# Patient Record
Sex: MALE
[De-identification: names, ages, dates, MRNs, and addresses within clinical notes are randomized per-mention and may not be internally consistent; named-entity substitution may affect disease eponyms.]

---

## 2019-05-29 ENCOUNTER — RX RENEWAL (OUTPATIENT)
Age: 54
End: 2019-05-29

## 2019-06-05 ENCOUNTER — APPOINTMENT (OUTPATIENT)
Dept: INTERNAL MEDICINE | Facility: CLINIC | Age: 54
End: 2019-06-05

## 2019-06-17 ENCOUNTER — RX CHANGE (OUTPATIENT)
Age: 54
End: 2019-06-17

## 2019-06-28 ENCOUNTER — APPOINTMENT (OUTPATIENT)
Dept: INTERNAL MEDICINE | Facility: CLINIC | Age: 54
End: 2019-06-28
Payer: COMMERCIAL

## 2019-06-28 VITALS
SYSTOLIC BLOOD PRESSURE: 130 MMHG | HEIGHT: 69 IN | DIASTOLIC BLOOD PRESSURE: 90 MMHG | HEART RATE: 78 BPM | WEIGHT: 219 LBS | OXYGEN SATURATION: 96 % | BODY MASS INDEX: 32.44 KG/M2 | TEMPERATURE: 98.6 F

## 2019-06-28 DIAGNOSIS — Z80.42 FAMILY HISTORY OF MALIGNANT NEOPLASM OF PROSTATE: ICD-10-CM

## 2019-06-28 DIAGNOSIS — Z80.8 FAMILY HISTORY OF MALIGNANT NEOPLASM OF OTHER ORGANS OR SYSTEMS: ICD-10-CM

## 2019-06-28 DIAGNOSIS — Z78.9 OTHER SPECIFIED HEALTH STATUS: ICD-10-CM

## 2019-06-28 DIAGNOSIS — M19.90 UNSPECIFIED OSTEOARTHRITIS, UNSPECIFIED SITE: ICD-10-CM

## 2019-06-28 PROCEDURE — 99204 OFFICE O/P NEW MOD 45 MIN: CPT | Mod: 25

## 2019-06-28 PROCEDURE — G0444 DEPRESSION SCREEN ANNUAL: CPT

## 2019-06-28 NOTE — HEALTH RISK ASSESSMENT
[Yes] : Yes [2 - 4 times a month (2 pts)] : 2-4 times a month (2 points) [1 or 2 (0 pts)] : 1 or 2 (0 points) [No falls in past year] : Patient reported no falls in the past year [1] : 1) Little interest or pleasure doing things for several days (1) [0] : 2) Feeling down, depressed, or hopeless: Not at all (0) [de-identified] : cycling  [] : No

## 2019-06-28 NOTE — PLAN
[FreeTextEntry1] : counselled patient on stress management, will increase duloxetine to 90mg a day, and will see therapist

## 2019-06-28 NOTE — COUNSELING
[Weight management counseling provided] : Weight management [ - Annual Depression Screening] : Annual Depression Screening [Behavioral health counseling provided] : behavioral health

## 2019-06-28 NOTE — HISTORY OF PRESENT ILLNESS
[FreeTextEntry8] : Pt presents for renewal of meds. doing well, except c/o of minor aches and pains, and foot pain. skin rash. \par s/p thr 2/2019\par uses trazodone to sleep, sometimes is not effective. increased stress at work.\par \par

## 2019-06-28 NOTE — PHYSICAL EXAM
[Well Nourished] : well nourished [No Acute Distress] : no acute distress [Normal Sclera/Conjunctiva] : normal sclera/conjunctiva [Well-Appearing] : well-appearing [Well Developed] : well developed [PERRL] : pupils equal round and reactive to light [EOMI] : extraocular movements intact [Normal Outer Ear/Nose] : the outer ears and nose were normal in appearance [Normal Oropharynx] : the oropharynx was normal [No Lymphadenopathy] : no lymphadenopathy [Supple] : supple [No JVD] : no jugular venous distention [Thyroid Normal, No Nodules] : the thyroid was normal and there were no nodules present [Clear to Auscultation] : lungs were clear to auscultation bilaterally [No Respiratory Distress] : no respiratory distress  [Regular Rhythm] : with a regular rhythm [No Accessory Muscle Use] : no accessory muscle use [Normal Rate] : normal rate  [No Murmur] : no murmur heard [Normal S1, S2] : normal S1 and S2 [No Varicosities] : no varicosities [No Abdominal Bruit] : a ~M bruit was not heard ~T in the abdomen [No Carotid Bruits] : no carotid bruits [Pedal Pulses Present] : the pedal pulses are present [No Edema] : there was no peripheral edema [No Extremity Clubbing/Cyanosis] : no extremity clubbing/cyanosis [Soft] : abdomen soft [No Palpable Aorta] : no palpable aorta [No Masses] : no abdominal mass palpated [Non-distended] : non-distended [Non Tender] : non-tender [No HSM] : no HSM [Normal Bowel Sounds] : normal bowel sounds [Normal Posterior Cervical Nodes] : no posterior cervical lymphadenopathy [Normal Anterior Cervical Nodes] : no anterior cervical lymphadenopathy [No CVA Tenderness] : no CVA  tenderness [No Spinal Tenderness] : no spinal tenderness [No Joint Swelling] : no joint swelling [Grossly Normal Strength/Tone] : grossly normal strength/tone [Coordination Grossly Intact] : coordination grossly intact [No Rash] : no rash [Normal Gait] : normal gait [No Focal Deficits] : no focal deficits [Deep Tendon Reflexes (DTR)] : deep tendon reflexes were 2+ and symmetric [Normal Affect] : the affect was normal [Normal Insight/Judgement] : insight and judgment were intact [de-identified] : seborrheic keraktosis

## 2019-08-20 ENCOUNTER — RX RENEWAL (OUTPATIENT)
Age: 54
End: 2019-08-20

## 2019-08-23 ENCOUNTER — RX RENEWAL (OUTPATIENT)
Age: 54
End: 2019-08-23

## 2019-09-06 ENCOUNTER — RX RENEWAL (OUTPATIENT)
Age: 54
End: 2019-09-06

## 2019-10-10 ENCOUNTER — RX RENEWAL (OUTPATIENT)
Age: 54
End: 2019-10-10

## 2019-10-17 ENCOUNTER — RX RENEWAL (OUTPATIENT)
Age: 54
End: 2019-10-17

## 2019-10-17 ENCOUNTER — MOBILE ON CALL (OUTPATIENT)
Age: 54
End: 2019-10-17

## 2019-11-04 ENCOUNTER — RX RENEWAL (OUTPATIENT)
Age: 54
End: 2019-11-04

## 2019-11-07 ENCOUNTER — RX RENEWAL (OUTPATIENT)
Age: 54
End: 2019-11-07

## 2020-01-31 ENCOUNTER — RX RENEWAL (OUTPATIENT)
Age: 55
End: 2020-01-31

## 2020-05-04 ENCOUNTER — RX RENEWAL (OUTPATIENT)
Age: 55
End: 2020-05-04

## 2020-05-15 ENCOUNTER — APPOINTMENT (OUTPATIENT)
Dept: INTERNAL MEDICINE | Facility: CLINIC | Age: 55
End: 2020-05-15
Payer: COMMERCIAL

## 2020-05-15 PROCEDURE — 99213 OFFICE O/P EST LOW 20 MIN: CPT | Mod: 95

## 2020-05-15 NOTE — PLAN
[FreeTextEntry1] : \par Anxiety: Stable. Continue medications as ordered and continue to monitor status\par \par Insomnia: Stable. Continue medications as ordered and continue to monitor status

## 2020-05-15 NOTE — HISTORY OF PRESENT ILLNESS
[Other Location: e.g. School (Enter Location, City,State)___] : at [unfilled], at the time of the visit. [Medical Office: ([facility])___] : at the medical office located in  [Patient] : the patient [Self] : self [FreeTextEntry8] : Pt telehealths in today from work. He is still working in his office from [employer] during pandemic. Pt reports that his wife was laid off receiving unemployment, but they have been doing well because she has been making home cooked meals and they have been able to actually sit down and enjoy dinner together. Pt says that he went for antibody test a couple days ago and is awaiting results. Pt says he has been minimizing his alcohol intake during pandemic. Pt says hes been feeling alright emotionally and is taking medications as prescribed. He is happy to still be working during pandemic. Pt denies any SOB, fever, cough, chill, or CP.

## 2020-06-23 ENCOUNTER — RX RENEWAL (OUTPATIENT)
Age: 55
End: 2020-06-23

## 2020-08-11 ENCOUNTER — RX RENEWAL (OUTPATIENT)
Age: 55
End: 2020-08-11

## 2020-10-08 ENCOUNTER — TRANSCRIPTION ENCOUNTER (OUTPATIENT)
Age: 55
End: 2020-10-08

## 2020-10-10 ENCOUNTER — RX RENEWAL (OUTPATIENT)
Age: 55
End: 2020-10-10

## 2020-10-19 ENCOUNTER — RX RENEWAL (OUTPATIENT)
Age: 55
End: 2020-10-19

## 2021-02-22 ENCOUNTER — RX RENEWAL (OUTPATIENT)
Age: 56
End: 2021-02-22

## 2021-07-30 ENCOUNTER — RX RENEWAL (OUTPATIENT)
Age: 56
End: 2021-07-30

## 2021-08-01 ENCOUNTER — TRANSCRIPTION ENCOUNTER (OUTPATIENT)
Age: 56
End: 2021-08-01

## 2021-08-30 ENCOUNTER — RX RENEWAL (OUTPATIENT)
Age: 56
End: 2021-08-30

## 2021-11-29 ENCOUNTER — RX RENEWAL (OUTPATIENT)
Age: 56
End: 2021-11-29

## 2021-12-07 ENCOUNTER — RX RENEWAL (OUTPATIENT)
Age: 56
End: 2021-12-07

## 2022-01-05 ENCOUNTER — RX RENEWAL (OUTPATIENT)
Age: 57
End: 2022-01-05

## 2022-02-13 ENCOUNTER — APPOINTMENT (OUTPATIENT)
Dept: FAMILY MEDICINE | Facility: CLINIC | Age: 57
End: 2022-02-13
Payer: COMMERCIAL

## 2022-02-13 VITALS
RESPIRATION RATE: 18 BRPM | SYSTOLIC BLOOD PRESSURE: 130 MMHG | DIASTOLIC BLOOD PRESSURE: 84 MMHG | OXYGEN SATURATION: 98 % | HEART RATE: 78 BPM

## 2022-02-13 VITALS — WEIGHT: 225 LBS | BODY MASS INDEX: 33.33 KG/M2 | HEIGHT: 69 IN

## 2022-02-13 DIAGNOSIS — K64.4 RESIDUAL HEMORRHOIDAL SKIN TAGS: ICD-10-CM

## 2022-02-13 DIAGNOSIS — G47.00 INSOMNIA, UNSPECIFIED: ICD-10-CM

## 2022-02-13 PROCEDURE — 93000 ELECTROCARDIOGRAM COMPLETE: CPT

## 2022-02-13 PROCEDURE — 99386 PREV VISIT NEW AGE 40-64: CPT | Mod: 25

## 2022-02-13 PROCEDURE — 36415 COLL VENOUS BLD VENIPUNCTURE: CPT

## 2022-02-13 PROCEDURE — 81003 URINALYSIS AUTO W/O SCOPE: CPT | Mod: QW

## 2022-02-13 RX ORDER — MULTIVITAMIN
TABLET ORAL
Refills: 0 | Status: ACTIVE | COMMUNITY
Start: 2022-02-13

## 2022-02-13 NOTE — HEALTH RISK ASSESSMENT
[Never] : Never [Yes] : Yes [2 - 4 times a month (2 pts)] : 2-4 times a month (2 points) [1 or 2 (0 pts)] : 1 or 2 (0 points) [Never (0 pts)] : Never (0 points) [No falls in past year] : Patient reported no falls in the past year [1] : 1) Little interest or pleasure doing things for several days (1) [0] : 2) Feeling down, depressed, or hopeless: Not at all (0) [de-identified] : n/a [Audit-CScore] : 2 [de-identified] : golf, biking, spin [de-identified] : regular  [HSN4Liqan] : 1

## 2022-02-13 NOTE — REVIEW OF SYSTEMS
[Anxiety] : anxiety [Negative] : Heme/Lymph [Nausea] : no nausea [Constipation] : no constipation [Vomiting] : no vomiting [Heartburn] : no heartburn [FreeTextEntry7] : hemorrhoids

## 2022-02-13 NOTE — HISTORY OF PRESENT ILLNESS
[FreeTextEntry1] : follow up  [de-identified] : 56 year old male presents for follow up- continued insomnia- didn’t feel that trazodone helped sleep, \par vaccinated 2 modernas and booster-  July 2021\par \par external hemorrhoid- last week had active bleed- self resolved. \par \par colonoscopy and endo- fall 2021- reports negative-

## 2022-02-14 LAB
25(OH)D3 SERPL-MCNC: 34 NG/ML
ALBUMIN SERPL ELPH-MCNC: 4.5 G/DL
ALP BLD-CCNC: 105 U/L
ALT SERPL-CCNC: 20 U/L
ANION GAP SERPL CALC-SCNC: 13 MMOL/L
APPEARANCE: CLEAR
AST SERPL-CCNC: 19 U/L
BASOPHILS # BLD AUTO: 0.04 K/UL
BASOPHILS NFR BLD AUTO: 0.6 %
BILIRUB SERPL-MCNC: 0.6 MG/DL
BILIRUBIN URINE: NEGATIVE
BLOOD URINE: NEGATIVE
BUN SERPL-MCNC: 18 MG/DL
CALCIUM SERPL-MCNC: 9.6 MG/DL
CHLORIDE SERPL-SCNC: 109 MMOL/L
CHOLEST SERPL-MCNC: 172 MG/DL
CO2 SERPL-SCNC: 23 MMOL/L
COLOR: NORMAL
CREAT SERPL-MCNC: 1.02 MG/DL
EOSINOPHIL # BLD AUTO: 0.3 K/UL
EOSINOPHIL NFR BLD AUTO: 4.5 %
ESTIMATED AVERAGE GLUCOSE: 108 MG/DL
GLUCOSE QUALITATIVE U: NEGATIVE
GLUCOSE SERPL-MCNC: 85 MG/DL
HBA1C MFR BLD HPLC: 5.4 %
HCT VFR BLD CALC: 41.2 %
HDLC SERPL-MCNC: 36 MG/DL
HGB BLD-MCNC: 14.5 G/DL
IMM GRANULOCYTES NFR BLD AUTO: 0.2 %
KETONES URINE: NEGATIVE
LDLC SERPL CALC-MCNC: 79 MG/DL
LEUKOCYTE ESTERASE URINE: NEGATIVE
LYMPHOCYTES # BLD AUTO: 1.93 K/UL
LYMPHOCYTES NFR BLD AUTO: 29 %
MAN DIFF?: NORMAL
MCHC RBC-ENTMCNC: 33.1 PG
MCHC RBC-ENTMCNC: 35.2 GM/DL
MCV RBC AUTO: 94.1 FL
MONOCYTES # BLD AUTO: 0.55 K/UL
MONOCYTES NFR BLD AUTO: 8.3 %
NEUTROPHILS # BLD AUTO: 3.83 K/UL
NEUTROPHILS NFR BLD AUTO: 57.4 %
NITRITE URINE: NEGATIVE
NONHDLC SERPL-MCNC: 137 MG/DL
PH URINE: 6.5
PLATELET # BLD AUTO: 250 K/UL
POTASSIUM SERPL-SCNC: 4.5 MMOL/L
PROT SERPL-MCNC: 6.7 G/DL
PROTEIN URINE: NORMAL
PSA SERPL-MCNC: 0.77 NG/ML
RBC # BLD: 4.38 M/UL
RBC # FLD: 12.2 %
SODIUM SERPL-SCNC: 144 MMOL/L
SPECIFIC GRAVITY URINE: 1.03
TRIGL SERPL-MCNC: 289 MG/DL
TSH SERPL-ACNC: 1.87 UIU/ML
UROBILINOGEN URINE: NORMAL
VIT B12 SERPL-MCNC: 647 PG/ML
WBC # FLD AUTO: 6.66 K/UL

## 2022-08-22 ENCOUNTER — RX RENEWAL (OUTPATIENT)
Age: 57
End: 2022-08-22

## 2022-11-30 ENCOUNTER — RX RENEWAL (OUTPATIENT)
Age: 57
End: 2022-11-30

## 2023-03-01 ENCOUNTER — RX RENEWAL (OUTPATIENT)
Age: 58
End: 2023-03-01

## 2023-03-26 ENCOUNTER — APPOINTMENT (OUTPATIENT)
Dept: FAMILY MEDICINE | Facility: CLINIC | Age: 58
End: 2023-03-26

## 2023-05-05 ENCOUNTER — RX RENEWAL (OUTPATIENT)
Age: 58
End: 2023-05-05

## 2023-06-09 ENCOUNTER — RX RENEWAL (OUTPATIENT)
Age: 58
End: 2023-06-09

## 2023-07-06 ENCOUNTER — RX RENEWAL (OUTPATIENT)
Age: 58
End: 2023-07-06

## 2023-08-21 ENCOUNTER — RX RENEWAL (OUTPATIENT)
Age: 58
End: 2023-08-21

## 2023-09-05 ENCOUNTER — RX RENEWAL (OUTPATIENT)
Age: 58
End: 2023-09-05

## 2023-09-06 RX ORDER — BUSPIRONE HYDROCHLORIDE 10 MG/1
10 TABLET ORAL 3 TIMES DAILY
Qty: 90 | Refills: 0 | Status: ACTIVE | COMMUNITY
Start: 2019-06-17 | End: 1900-01-01

## 2023-10-16 ENCOUNTER — APPOINTMENT (OUTPATIENT)
Dept: FAMILY MEDICINE | Facility: CLINIC | Age: 58
End: 2023-10-16
Payer: COMMERCIAL

## 2023-10-16 VITALS
WEIGHT: 219 LBS | OXYGEN SATURATION: 97 % | DIASTOLIC BLOOD PRESSURE: 90 MMHG | HEIGHT: 69 IN | SYSTOLIC BLOOD PRESSURE: 155 MMHG | HEART RATE: 76 BPM | BODY MASS INDEX: 32.44 KG/M2

## 2023-10-16 DIAGNOSIS — Z00.00 ENCOUNTER FOR GENERAL ADULT MEDICAL EXAMINATION W/OUT ABNORMAL FINDINGS: ICD-10-CM

## 2023-10-16 DIAGNOSIS — F41.9 ANXIETY DISORDER, UNSPECIFIED: ICD-10-CM

## 2023-10-16 PROCEDURE — 36415 COLL VENOUS BLD VENIPUNCTURE: CPT

## 2023-10-16 PROCEDURE — 99396 PREV VISIT EST AGE 40-64: CPT | Mod: 25

## 2023-10-16 RX ORDER — BUPROPION HYDROCHLORIDE 100 MG/1
100 TABLET, FILM COATED, EXTENDED RELEASE ORAL
Qty: 30 | Refills: 0 | Status: ACTIVE | COMMUNITY
Start: 2023-10-16 | End: 1900-01-01

## 2023-10-16 RX ORDER — TRAZODONE HYDROCHLORIDE 50 MG/1
50 TABLET ORAL
Qty: 90 | Refills: 1 | Status: DISCONTINUED | COMMUNITY
Start: 2019-05-29 | End: 2023-10-16

## 2023-10-17 LAB
ALBUMIN SERPL ELPH-MCNC: 4.7 G/DL
ALP BLD-CCNC: 95 U/L
ALT SERPL-CCNC: 19 U/L
ANION GAP SERPL CALC-SCNC: 12 MMOL/L
AST SERPL-CCNC: 20 U/L
BASOPHILS # BLD AUTO: 0.05 K/UL
BASOPHILS NFR BLD AUTO: 0.6 %
BILIRUB SERPL-MCNC: 1 MG/DL
BUN SERPL-MCNC: 11 MG/DL
CALCIUM SERPL-MCNC: 9.7 MG/DL
CHLORIDE SERPL-SCNC: 104 MMOL/L
CHOLEST SERPL-MCNC: 199 MG/DL
CO2 SERPL-SCNC: 24 MMOL/L
CREAT SERPL-MCNC: 0.93 MG/DL
EGFR: 96 ML/MIN/1.73M2
EOSINOPHIL # BLD AUTO: 0.13 K/UL
EOSINOPHIL NFR BLD AUTO: 1.6 %
ESTIMATED AVERAGE GLUCOSE: 111 MG/DL
GLUCOSE SERPL-MCNC: 90 MG/DL
HBA1C MFR BLD HPLC: 5.5 %
HCT VFR BLD CALC: 42.3 %
HDLC SERPL-MCNC: 39 MG/DL
HGB BLD-MCNC: 15.1 G/DL
IMM GRANULOCYTES NFR BLD AUTO: 0.3 %
LDLC SERPL CALC-MCNC: 132 MG/DL
LYMPHOCYTES # BLD AUTO: 2.29 K/UL
LYMPHOCYTES NFR BLD AUTO: 29 %
MAN DIFF?: NORMAL
MCHC RBC-ENTMCNC: 34.1 PG
MCHC RBC-ENTMCNC: 35.7 GM/DL
MCV RBC AUTO: 95.5 FL
MONOCYTES # BLD AUTO: 0.52 K/UL
MONOCYTES NFR BLD AUTO: 6.6 %
NEUTROPHILS # BLD AUTO: 4.88 K/UL
NEUTROPHILS NFR BLD AUTO: 61.9 %
NONHDLC SERPL-MCNC: 160 MG/DL
PLATELET # BLD AUTO: 269 K/UL
POTASSIUM SERPL-SCNC: 4.4 MMOL/L
PROT SERPL-MCNC: 7 G/DL
PSA SERPL-MCNC: 0.86 NG/ML
RBC # BLD: 4.43 M/UL
RBC # FLD: 12 %
SODIUM SERPL-SCNC: 140 MMOL/L
TRIGL SERPL-MCNC: 156 MG/DL
TSH SERPL-ACNC: 1.79 UIU/ML
WBC # FLD AUTO: 7.89 K/UL

## 2023-11-02 RX ORDER — OMEGA-3/DHA/EPA/FISH OIL 300-1000MG
1000 CAPSULE ORAL DAILY
Qty: 90 | Refills: 0 | Status: ACTIVE | COMMUNITY
Start: 2023-11-02 | End: 1900-01-01

## 2023-11-06 ENCOUNTER — RX RENEWAL (OUTPATIENT)
Age: 58
End: 2023-11-06

## 2023-11-06 RX ORDER — DULOXETINE HYDROCHLORIDE 60 MG/1
60 CAPSULE, DELAYED RELEASE PELLETS ORAL
Qty: 60 | Refills: 0 | Status: ACTIVE | COMMUNITY
Start: 2019-06-28 | End: 1900-01-01

## 2023-11-22 ENCOUNTER — APPOINTMENT (OUTPATIENT)
Dept: FAMILY MEDICINE | Facility: CLINIC | Age: 58
End: 2023-11-22

## 2023-12-26 ENCOUNTER — APPOINTMENT (OUTPATIENT)
Dept: ORTHOPEDIC SURGERY | Facility: CLINIC | Age: 58
End: 2023-12-26
Payer: COMMERCIAL

## 2023-12-26 VITALS — HEIGHT: 69 IN | WEIGHT: 219 LBS | BODY MASS INDEX: 32.44 KG/M2

## 2023-12-26 DIAGNOSIS — M19.012 PRIMARY OSTEOARTHRITIS, LEFT SHOULDER: ICD-10-CM

## 2023-12-26 PROCEDURE — 73030 X-RAY EXAM OF SHOULDER: CPT | Mod: LT

## 2023-12-26 PROCEDURE — 99203 OFFICE O/P NEW LOW 30 MIN: CPT

## 2023-12-26 NOTE — ASSESSMENT
[FreeTextEntry1] : left shoulder pain for years.  history of possible injury playing football in college. moderate  to mild oa present.  concern for cuff tear.  has done PT in the past without significant improvement.  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

## 2023-12-26 NOTE — PHYSICAL EXAM
[4 ___] : forward flexion 4[unfilled]/5 [4___] : abduction 4[unfilled]/5 [5___] : internal rotation 5[unfilled]/5 [] : motor and sensory intact distally [Left] : left shoulder [Degenerative change] : Degenerative change [TWNoteComboBox7] : active forward flexion 140 degrees [TWNoteComboBox6] : internal rotation L5 [de-identified] : external rotation 30 degrees

## 2023-12-26 NOTE — HISTORY OF PRESENT ILLNESS
[Gradual] : gradual [7] : 7 [2] : 2 [Dull/Aching] : dull/aching [Sharp] : sharp [Constant] : constant [Household chores] : household chores [Leisure] : leisure [Work] : work [Sleep] : sleep [Meds] : meds [de-identified] : 12/26/23:  left shoulder pain for years. [] : Post Surgical Visit: no [FreeTextEntry1] : left shoulder [FreeTextEntry5] : pt states that they have had pain in the shoulder for years but the pain started getting worse recently. Pt states that they have pain with certain motions and feels clicking in the shoulder.  [FreeTextEntry7] : left bicep [de-identified] : motion/pressure

## 2024-01-09 ENCOUNTER — APPOINTMENT (OUTPATIENT)
Dept: MRI IMAGING | Facility: CLINIC | Age: 59
End: 2024-01-09

## 2024-01-16 ENCOUNTER — APPOINTMENT (OUTPATIENT)
Dept: ORTHOPEDIC SURGERY | Facility: CLINIC | Age: 59
End: 2024-01-16

## 2024-03-21 ENCOUNTER — APPOINTMENT (OUTPATIENT)
Dept: MRI IMAGING | Facility: CLINIC | Age: 59
End: 2024-03-21

## 2024-03-28 ENCOUNTER — APPOINTMENT (OUTPATIENT)
Dept: MRI IMAGING | Facility: CLINIC | Age: 59
End: 2024-03-28
Payer: COMMERCIAL

## 2024-03-28 PROCEDURE — 73221 MRI JOINT UPR EXTREM W/O DYE: CPT | Mod: LT

## 2024-04-02 ENCOUNTER — APPOINTMENT (OUTPATIENT)
Dept: ORTHOPEDIC SURGERY | Facility: CLINIC | Age: 59
End: 2024-04-02
Payer: COMMERCIAL

## 2024-04-02 VITALS — WEIGHT: 219 LBS | HEIGHT: 69 IN | BODY MASS INDEX: 32.44 KG/M2

## 2024-04-02 DIAGNOSIS — S43.432D SUPERIOR GLENOID LABRUM LESION OF LEFT SHOULDER, SUBSEQUENT ENCOUNTER: ICD-10-CM

## 2024-04-02 DIAGNOSIS — S46.012D STRAIN OF MUSCLE(S) AND TENDON(S) OF THE ROTATOR CUFF OF LEFT SHOULDER, SUBSEQUENT ENCOUNTER: ICD-10-CM

## 2024-04-02 DIAGNOSIS — M25.512 PAIN IN LEFT SHOULDER: ICD-10-CM

## 2024-04-02 PROCEDURE — J3490M: CUSTOM | Mod: LT

## 2024-04-02 PROCEDURE — 99214 OFFICE O/P EST MOD 30 MIN: CPT | Mod: 25

## 2024-04-02 PROCEDURE — 20611 DRAIN/INJ JOINT/BURSA W/US: CPT | Mod: LT

## 2024-04-02 NOTE — HISTORY OF PRESENT ILLNESS
[Gradual] : gradual [7] : 7 [2] : 2 [Dull/Aching] : dull/aching [Sharp] : sharp [Constant] : constant [Household chores] : household chores [Leisure] : leisure [Work] : work [Sleep] : sleep [Meds] : meds [de-identified] : 12/26/23:  left shoulder pain for years. 4/2/24:  left shoulder pain persists.  [] : Post Surgical Visit: no [FreeTextEntry1] : left shoulder [FreeTextEntry5] : pt states that they have had pain in the shoulder for years but the pain started getting worse recently. Pt states that they have pain with certain motions and feels clicking in the shoulder.  [de-identified] : motion/pressure [FreeTextEntry7] : left bicep [de-identified] : none

## 2024-04-02 NOTE — PROCEDURE
[Large Joint Injection] : Large joint injection [Left] : of the left [Shoulder] : shoulder [Pain] : pain [Alcohol] : alcohol [Betadine] : betadine [___ cc    3mg] :  Betamethasone ([place]) ~Vcc of 3mg [___ cc    1%] : Lidocaine ~Vcc of 1%  [] : Patient tolerated procedure well [Call if redness, pain or fever occur] : call if redness, pain or fever occur [Apply ice for 15min out of every hour for the next 12-24 hours as tolerated] : apply ice for 15 minutes out of every hour for the next 12-24 hours as tolerated [Patient was advised to rest the joint(s) for ____ days] : patient was advised to rest the joint(s) for [unfilled] days [Previous OTC use and PT nontherapeutic] : patient has tried OTC's including aspirin, Ibuprofen, Aleve, etc or prescription NSAIDS, and/or exercises at home and/or physical therapy without satisfactory response [Patient had decreased mobility in the joint] : patient had decreased mobility in the joint [Risks, benefits, alternatives discussed / Verbal consent obtained] : the risks benefits, and alternatives have been discussed, and verbal consent was obtained [Precise injection in area of tear] : precise injection in area of tear [Visualization of the needle and placement of injection was performed without complication] : visualization of the needle and placement of injection was performed without complication [All ultrasound images have been permanently captured and stored accordingly in our picture archiving and communication system] : All ultrasound images have been permanently captured and stored accordingly in our picture archiving and communication system [Inflammation] : inflammation

## 2024-04-02 NOTE — PHYSICAL EXAM
[4___] : abduction 4[unfilled]/5 [4 ___] : forward flexion 4[unfilled]/5 [5___] : internal rotation 5[unfilled]/5 [Degenerative change] : Degenerative change [Left] : left shoulder [] : motor and sensory intact distally [TWNoteComboBox7] : active forward flexion 140 degrees [TWNoteComboBox6] : internal rotation L5 [de-identified] : external rotation 30 degrees

## 2024-04-02 NOTE — ASSESSMENT
[FreeTextEntry1] : left shoulder pain for years.  history of possible injury playing football in college. moderate  to mild oa present.  mri shows ptrct, diffuse labral tearing, gh oa, ac joint oa.  still pain.  considering scope if fall / winter this year.  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

## 2024-07-16 ENCOUNTER — APPOINTMENT (OUTPATIENT)
Dept: ORTHOPEDIC SURGERY | Facility: CLINIC | Age: 59
End: 2024-07-16
Payer: COMMERCIAL

## 2024-07-16 DIAGNOSIS — S46.012D STRAIN OF MUSCLE(S) AND TENDON(S) OF THE ROTATOR CUFF OF LEFT SHOULDER, SUBSEQUENT ENCOUNTER: ICD-10-CM

## 2024-07-16 DIAGNOSIS — S43.432D SUPERIOR GLENOID LABRUM LESION OF LEFT SHOULDER, SUBSEQUENT ENCOUNTER: ICD-10-CM

## 2024-07-16 DIAGNOSIS — M19.012 PRIMARY OSTEOARTHRITIS, LEFT SHOULDER: ICD-10-CM

## 2024-07-16 PROCEDURE — 99214 OFFICE O/P EST MOD 30 MIN: CPT

## 2024-11-05 ENCOUNTER — APPOINTMENT (OUTPATIENT)
Dept: ORTHOPEDIC SURGERY | Facility: CLINIC | Age: 59
End: 2024-11-05

## 2024-11-18 ENCOUNTER — TRANSCRIPTION ENCOUNTER (OUTPATIENT)
Age: 59
End: 2024-11-18

## 2024-11-18 ENCOUNTER — APPOINTMENT (OUTPATIENT)
Dept: ORTHOPEDIC SURGERY | Facility: CLINIC | Age: 59
End: 2024-11-18

## 2024-11-18 PROCEDURE — XXXXX: CPT | Mod: 1L

## 2024-11-25 ENCOUNTER — APPOINTMENT (OUTPATIENT)
Age: 59
End: 2024-11-25
Payer: COMMERCIAL

## 2024-11-25 PROCEDURE — 29823 SHO ARTHRS SRG XTNSV DBRDMT: CPT | Mod: 59,LT

## 2024-11-25 PROCEDURE — 29828 SHO ARTHRS SRG BICP TENODSIS: CPT | Mod: AS,LT

## 2024-11-25 PROCEDURE — 29823 SHO ARTHRS SRG XTNSV DBRDMT: CPT | Mod: AS,59,LT

## 2024-11-25 PROCEDURE — 29826 SHO ARTHRS SRG DECOMPRESSION: CPT | Mod: LT

## 2024-11-25 PROCEDURE — 29824 SHO ARTHRS SRG DSTL CLAVICLC: CPT | Mod: LT

## 2024-11-25 PROCEDURE — 29828 SHO ARTHRS SRG BICP TENODSIS: CPT | Mod: LT

## 2024-11-25 PROCEDURE — 29824 SHO ARTHRS SRG DSTL CLAVICLC: CPT | Mod: AS,LT

## 2024-11-25 PROCEDURE — 29826 SHO ARTHRS SRG DECOMPRESSION: CPT | Mod: AS,LT

## 2024-11-25 RX ORDER — OXYCODONE AND ACETAMINOPHEN 5; 325 MG/1; MG/1
5-325 TABLET ORAL
Qty: 42 | Refills: 0 | Status: ACTIVE | COMMUNITY
Start: 2024-11-25 | End: 1900-01-01

## 2024-12-03 ENCOUNTER — APPOINTMENT (OUTPATIENT)
Dept: ORTHOPEDIC SURGERY | Facility: CLINIC | Age: 59
End: 2024-12-03
Payer: COMMERCIAL

## 2024-12-03 DIAGNOSIS — M19.012 PRIMARY OSTEOARTHRITIS, LEFT SHOULDER: ICD-10-CM

## 2024-12-03 DIAGNOSIS — S46.012D STRAIN OF MUSCLE(S) AND TENDON(S) OF THE ROTATOR CUFF OF LEFT SHOULDER, SUBSEQUENT ENCOUNTER: ICD-10-CM

## 2024-12-03 DIAGNOSIS — S43.432D SUPERIOR GLENOID LABRUM LESION OF LEFT SHOULDER, SUBSEQUENT ENCOUNTER: ICD-10-CM

## 2024-12-03 PROCEDURE — 99024 POSTOP FOLLOW-UP VISIT: CPT

## 2025-01-09 ENCOUNTER — APPOINTMENT (OUTPATIENT)
Dept: ORTHOPEDIC SURGERY | Facility: CLINIC | Age: 60
End: 2025-01-09
Payer: COMMERCIAL

## 2025-01-09 DIAGNOSIS — S46.012D STRAIN OF MUSCLE(S) AND TENDON(S) OF THE ROTATOR CUFF OF LEFT SHOULDER, SUBSEQUENT ENCOUNTER: ICD-10-CM

## 2025-01-09 DIAGNOSIS — S43.432D SUPERIOR GLENOID LABRUM LESION OF LEFT SHOULDER, SUBSEQUENT ENCOUNTER: ICD-10-CM

## 2025-01-09 PROCEDURE — 99024 POSTOP FOLLOW-UP VISIT: CPT

## 2025-02-20 ENCOUNTER — APPOINTMENT (OUTPATIENT)
Dept: ORTHOPEDIC SURGERY | Facility: CLINIC | Age: 60
End: 2025-02-20
Payer: COMMERCIAL

## 2025-02-20 DIAGNOSIS — S46.012D STRAIN OF MUSCLE(S) AND TENDON(S) OF THE ROTATOR CUFF OF LEFT SHOULDER, SUBSEQUENT ENCOUNTER: ICD-10-CM

## 2025-02-20 DIAGNOSIS — M25.512 PAIN IN LEFT SHOULDER: ICD-10-CM

## 2025-02-20 DIAGNOSIS — M19.012 PRIMARY OSTEOARTHRITIS, LEFT SHOULDER: ICD-10-CM

## 2025-02-20 DIAGNOSIS — S43.432D SUPERIOR GLENOID LABRUM LESION OF LEFT SHOULDER, SUBSEQUENT ENCOUNTER: ICD-10-CM

## 2025-02-20 PROCEDURE — 99024 POSTOP FOLLOW-UP VISIT: CPT

## 2025-04-24 ENCOUNTER — APPOINTMENT (OUTPATIENT)
Dept: ORTHOPEDIC SURGERY | Facility: CLINIC | Age: 60
End: 2025-04-24
Payer: COMMERCIAL

## 2025-04-24 VITALS — HEIGHT: 69 IN | BODY MASS INDEX: 32.58 KG/M2 | WEIGHT: 220 LBS

## 2025-04-24 DIAGNOSIS — S46.012D STRAIN OF MUSCLE(S) AND TENDON(S) OF THE ROTATOR CUFF OF LEFT SHOULDER, SUBSEQUENT ENCOUNTER: ICD-10-CM

## 2025-04-24 DIAGNOSIS — S43.432D SUPERIOR GLENOID LABRUM LESION OF LEFT SHOULDER, SUBSEQUENT ENCOUNTER: ICD-10-CM

## 2025-04-24 DIAGNOSIS — M19.012 PRIMARY OSTEOARTHRITIS, LEFT SHOULDER: ICD-10-CM

## 2025-04-24 PROCEDURE — 99213 OFFICE O/P EST LOW 20 MIN: CPT
